# Patient Record
Sex: MALE | Race: WHITE
[De-identification: names, ages, dates, MRNs, and addresses within clinical notes are randomized per-mention and may not be internally consistent; named-entity substitution may affect disease eponyms.]

---

## 2017-06-18 ENCOUNTER — HOSPITAL ENCOUNTER (INPATIENT)
Dept: HOSPITAL 75 - ER | Age: 62
LOS: 3 days | Discharge: HOME | DRG: 690 | End: 2017-06-21
Attending: INTERNAL MEDICINE | Admitting: INTERNAL MEDICINE
Payer: COMMERCIAL

## 2017-06-18 VITALS — SYSTOLIC BLOOD PRESSURE: 122 MMHG | DIASTOLIC BLOOD PRESSURE: 69 MMHG

## 2017-06-18 VITALS — DIASTOLIC BLOOD PRESSURE: 60 MMHG | SYSTOLIC BLOOD PRESSURE: 130 MMHG

## 2017-06-18 VITALS — BODY MASS INDEX: 24.38 KG/M2 | WEIGHT: 180 LBS | HEIGHT: 72 IN

## 2017-06-18 VITALS — SYSTOLIC BLOOD PRESSURE: 98 MMHG | DIASTOLIC BLOOD PRESSURE: 60 MMHG

## 2017-06-18 DIAGNOSIS — B96.20: ICD-10-CM

## 2017-06-18 DIAGNOSIS — I10: ICD-10-CM

## 2017-06-18 DIAGNOSIS — M10.9: ICD-10-CM

## 2017-06-18 DIAGNOSIS — N10: Primary | ICD-10-CM

## 2017-06-18 LAB
ALBUMIN SERPL-MCNC: 4 GM/DL (ref 3.2–4.5)
ALT SERPL-CCNC: 20 U/L (ref 0–55)
ANION GAP SERPL CALC-SCNC: 11 MMOL/L (ref 5–14)
AST SERPL-CCNC: 21 U/L (ref 5–34)
BASOPHILS # BLD AUTO: 0 10^3/UL (ref 0–0.1)
BASOPHILS NFR BLD AUTO: 0 % (ref 0–10)
BILIRUB SERPL-MCNC: 1.2 MG/DL (ref 0.1–1)
BILIRUB UR QL STRIP: NEGATIVE
BUN SERPL-MCNC: 12 MG/DL (ref 7–18)
BUN/CREAT SERPL: 14 (ref 0–20)
CALCIUM SERPL-MCNC: 9.7 MG/DL (ref 8.5–10.1)
CHLORIDE SERPL-SCNC: 103 MMOL/L (ref 98–107)
CO2 SERPL-SCNC: 21 MMOL/L (ref 21–32)
CREAT SERPL-MCNC: 0.86 MG/DL (ref 0.6–1.3)
EOSINOPHIL # BLD AUTO: 0 10^3/UL (ref 0–0.3)
EOSINOPHIL NFR BLD AUTO: 0 % (ref 0–10)
ERYTHROCYTE [DISTWIDTH] IN BLOOD BY AUTOMATED COUNT: 12.9 % (ref 10–14.5)
GFR SERPLBLD BASED ON 1.73 SQ M-ARVRAT: > 60 ML/MIN
GLUCOSE SERPL-MCNC: 178 MG/DL (ref 70–105)
HEMOLYSIS: 7 (ref -100–29)
ICTERUS: 1.2 (ref -100–1.9)
KETONES UR QL STRIP: (no result)
LEUKOCYTE ESTERASE UR QL STRIP: (no result)
LIPEMIA: -1 (ref -100–49)
LYMPHOCYTES # BLD AUTO: 1.2 X 10^3 (ref 1–4)
LYMPHOCYTES NFR BLD AUTO: 6 % (ref 12–44)
MCH RBC QN AUTO: 31 PG (ref 25–34)
MCHC RBC AUTO-ENTMCNC: 33 G/DL (ref 32–36)
MCV RBC AUTO: 92 FL (ref 80–99)
MONOCYTES # BLD AUTO: 2 X 10^3 (ref 0–1)
MONOCYTES NFR BLD AUTO: 9 % (ref 0–12)
NEUTROPHILS # BLD AUTO: 18.6 X 10^3 (ref 1.8–7.8)
NEUTROPHILS NFR BLD AUTO: 85 % (ref 42–75)
NEUTS BAND NFR BLD MANUAL: 90 %
NITRITE UR QL STRIP: POSITIVE
PH UR STRIP: 7 [PH] (ref 5–9)
PLATELET # BLD: 180 10^3/UL (ref 130–400)
PMV BLD AUTO: 10.4 FL (ref 7.4–10.4)
POTASSIUM SERPL-SCNC: 4.3 MMOL/L (ref 3.6–5)
PROT SERPL-MCNC: 7.7 GM/DL (ref 6.4–8.2)
PROT UR QL STRIP: (no result)
RBC # BLD AUTO: 4.73 10^6/UL (ref 4.35–5.85)
SODIUM SERPL-SCNC: 135 MMOL/L (ref 135–145)
SP GR UR STRIP: 1.01 (ref 1.02–1.02)
SQUAMOUS #/AREA URNS HPF: (no result) /HPF
UROBILINOGEN UR-MCNC: NORMAL MG/DL
VARIANT LYMPHS NFR BLD MANUAL: 4 %
WBC # BLD AUTO: 21.9 10^3/UL (ref 4.3–11)
WBC #/AREA URNS HPF: >100 /HPF

## 2017-06-18 PROCEDURE — 85007 BL SMEAR W/DIFF WBC COUNT: CPT

## 2017-06-18 PROCEDURE — 87186 SC STD MICRODIL/AGAR DIL: CPT

## 2017-06-18 PROCEDURE — 36415 COLL VENOUS BLD VENIPUNCTURE: CPT

## 2017-06-18 PROCEDURE — 81000 URINALYSIS NONAUTO W/SCOPE: CPT

## 2017-06-18 PROCEDURE — 87040 BLOOD CULTURE FOR BACTERIA: CPT

## 2017-06-18 PROCEDURE — 87088 URINE BACTERIA CULTURE: CPT

## 2017-06-18 PROCEDURE — 85025 COMPLETE CBC W/AUTO DIFF WBC: CPT

## 2017-06-18 PROCEDURE — 85027 COMPLETE CBC AUTOMATED: CPT

## 2017-06-18 PROCEDURE — 83605 ASSAY OF LACTIC ACID: CPT

## 2017-06-18 PROCEDURE — 80053 COMPREHEN METABOLIC PANEL: CPT

## 2017-06-18 RX ADMIN — ACETAMINOPHEN PRN MG: 500 TABLET ORAL at 16:29

## 2017-06-18 RX ADMIN — ZOLPIDEM TARTRATE SCH MG: 5 TABLET ORAL at 22:00

## 2017-06-18 RX ADMIN — LACTULOSE SCH GM: 20 SOLUTION ORAL at 18:53

## 2017-06-18 RX ADMIN — SODIUM CHLORIDE SCH MLS/HR: 900 INJECTION, SOLUTION INTRAVENOUS at 09:37

## 2017-06-18 RX ADMIN — LACTULOSE SCH GM: 20 SOLUTION ORAL at 10:16

## 2017-06-18 RX ADMIN — SODIUM CHLORIDE SCH MLS/HR: 900 INJECTION, SOLUTION INTRAVENOUS at 18:52

## 2017-06-18 RX ADMIN — LACTULOSE SCH GM: 20 SOLUTION ORAL at 13:42

## 2017-06-18 NOTE — HISTORY & PHYSICAL-HOSPITALIST
HPI


History of Present Illness:


HPI/Chief Complaint


CC: Fever and chills and dysuria





HPI: This is a 62-year-old white male clinic patient of Dr. Joshua that sees 

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 

years that presents to the emergency room this morning at 7 o'clock with fever 

chills and and ambulate urinate.  He reports that the symptoms started Friday 

morning and had his daughter's wedding rehearsal at that point was taking 

ibuprofen and Tylenol for nonspecific fatigue and pain especially in the back 

that then progressed along the day of the wedding on Saturday becoming feverish 

having sweats and chilling then began having problems urinating feeling like 

the urine was going the wrong direction.  He was becoming very nauseated and 

the severity of his back pain was too much to bear so he presented to the 

emergency room found to have acute polynephritis with one-to-one fever and 

white count of 21.9.  Patient feels much better since IV fluids anti-emetics 

and pain medication have been initiated.


Source:  patient


Exam Limitations:  no limitations


Date Seen


6/18/17


Time Seen by Provider:  09:45


Attending Physician


Ariella Ludwig Floyd R MD


Referring Physician





Date of Admission


Jun 18, 2017 at 08:30





Home Medications & Allergies


Home Medications


Reviewed patient Home Medication Reconciliation Form





Allergies





Allergies


Coded Allergies


  No Known Drug Allergies (Unverified6/18/17)








Past Medical-Social-Family Hx


Patient Social History


Marrital Status:  


Employed/Student:  employed (35 years [employer])


Alcohol Use:  Occasionally Uses


Recreational Drug Use:  No


Smoking Status:  Never a Smoker


Physical Abuse Screen:  No


Sexual Abuse:  No


Recent Foreign Travel:  No


Contact w/other who traveled:  No


Recent Hopitalizations:  No


Recent Infectious Disease Expo:  No





Seasonal Allergies


Seasonal Allergies:  No





Surgeries


HX Surgeries:  Yes


Surgeries:  Orthopedic (meniscus)





Respiratory


Hx Respiratory Disorders:  No





Cardiovascular


Hx Cardiovascular Disorders:  Yes


Cardiac Disorders:  Hypertension





Neurological


Hx Neurological Disorders:  No





Reproductive System


Sexually Transmitted Disease:  No


HIV/AIDS:  No





Genitourinary


Hx Genitourinary Disorders:  No





Gastrointestinal


Hx Gastrointestinal Disorders:  No





Musculoskeletal


Hx Musculoskeletal Disorders:  No


Musculoskeletal Disorders:  Gout





Endocrine


Hx Endocrine Disorders:  No





HEENT


HX ENT Disorders:  No


Hearing Impairment:  Denies





Cancer


Hx Cancer:  No





Psychosocial


Hx Psychiatric Problems:  No





Integumentary


HX Skin/Integumentary Disorder:  No





Blood Transfusions


Adverse Reaction to a Blood Tr:  No





Reviewed Nursing Assessment


Reviewed/Agree w Nursing PMH:  Yes





Family Medical History


Family Hx:  


Arthritis


Cardiovascular disease


Cataracts


Diabetes mellitus


Hypertension


Kidney disease


Myocardial infarction





Review of Systems


Date Seen by Provider:  Jun 18, 2017


Time Seen by Provider:  09:45


Constitutional:  see HPI, chills, diaphoresis, dizziness, fever, malaise, 

weakness


EENTM:  no symptoms reported


Respiratory:  no symptoms reported


Cardiovascular:  no symptoms reported


Gastrointestinal:  loss of appetite, nausea, vomiting


Genitourinary:  decreased output, dysuria, frequency, hematuria, hesitancy, pain


Musculoskeletal:  back pain


Skin:  no symptoms reported


Psychiatric/Neurological:  Anxiety


All Other Systems Reviewed


Negative Unless Noted:  Yes





Physical Exam


Physical Exam


Vital Signs





Vital Sign - Last 12Hours








 6/18/17 6/18/17





 07:50 08:55


 


Temp 98.6 


 


Pulse 103 


 


Resp 18 


 


B/P (MAP) 141/89 


 


Pulse Ox  98


 


O2 Delivery  Room Air





Capillary Refill : Less Than 3 Seconds


General Appearance:  No Apparent Distress, WD/WN, Other (moderately acutely ill)


Eyes:  Bilateral Eye Normal Inspection, Bilateral Eye PERRL


HEENT:  PERRL/EOMI, Normal ENT Inspection, Pharynx Normal


Neck:  Full Range of Motion, Normal Inspection, Non Tender, Supple, Carotid 

Bruit


Respiratory:  Chest Non Tender, Lungs Clear, Normal Breath Sounds, No Accessory 

Muscle Use, No Respiratory Distress


Cardiovascular:  Regular Rate, Rhythm, No Edema, No Gallop, No JVD, No Murmur, 

Normal Peripheral Pulses


Gastrointestinal:  Normal Bowel Sounds, No Organomegaly, No Pulsatile Mass, Non 

Tender, Soft


Back:  Normal Inspection, No CVA Tenderness, No Vertebral Tenderness


Extremity:  Normal Capillary Refill, Normal Inspection, Normal Range of Motion, 

Non Tender, No Calf Tenderness, No Pedal Edema


Neurologic/Psychiatric:  Alert, Oriented x3, No Motor/Sensory Deficits, Normal 

Mood/Affect


Skin:  Normal Color, Damp


Lymphatic:  No Adenopathy





Results


Results/Procedures


Lab


Laboratory Tests


6/18/17 07:55














Assessment/Plan


Admission Diagnosis


Assessment:


Acute pyelonephritis with fever and chills and leukocytosis of 21,000


History of hypertension





Assessment and Plan


Plan:


Empiric antibiotics of Rocephin


Pancultured


Await urine culture results


IV fluids


Anti-medics


Antipyretics


SCDs





Clinical Quality Measures


DVT/VTE Risk/Contraindication:


Risk Factor Score Per Nursing:  3


RFS Level Per Nursing on Admit:  3=High











ARIELLA LUDWIG DO Jun 18, 2017 11:41

## 2017-06-18 NOTE — ED ABDOMINAL PAIN
General


Chief Complaint:  Fever-Adult/Adol


Stated Complaint:  BACK ACHE,SWEAT,FEVER


Source of Information:  Patient, Family


Exam Limitations:  No Limitations





History of Present Illness


Time Seen By Provider:  08:10


Initial Comments


This 62-year-old male presents with a 2 day history of fever and backache.  The 

patient has had associated dysuria.  The patient has had no associated headache 

or stiff neck, productive cough, chest pain or palpitations, blood in the stool 

or vomiting.





The patient is under the care of Dr. Joshua.  





The patient's significant past medical history includes hypertension and gout.





Allergies and Home Medications


Allergies


Coded Allergies:  


     No Known Drug Allergies (Unverified , 6/18/17)





Home Medications


Allopurinol 300 Mg Tablet, #90 (Reported)


Amlodipine Besylate 5 Mg Tablet, #90 (Reported)


Enalapril Maleate 20 Mg Tablet, #90 (Reported)


Hydrocodone/Acetaminophen 1 Each Tablet, #60 (Reported)


Meloxicam 15 Mg Tablet, #30 (Reported)





Review of Systems


Constitutional:  No chills, fever, malaise


EENTM:  No Blurred Vision


Respiratory:  Denies Cough


Cardiovascular:  Denies Chest Pain


Gastrointestinal:  Denies Abdominal Pain


Genitourinary:  Burning, Discharge, Frequency, Denies Flank Pain


Musculoskeletal:  back pain


Skin:  No rash


Psychiatric/Neurological:  No Symptoms Reported


Endocrine:  No Symptoms Reported


Hematologic/Lymphatic:  No Symptoms Reported





Past Medical-Social-Family Hx


Patient Social History


Recent Foreign Travel:  No


Contact w/Someone Who Travel:  No





Reviewed Nursing Assessment


Reviewed/Agree w Nursing PMH:  Yes





Physical Exam


Vital Signs





 VS - Last 72 Hours, by Label








 6/18/17





 07:50


 


Temp 98.6


 


Pulse 103


 


Resp 18


 


B/P (MAP) 141/89





Capillary Refill :


General Appearance:  WD/WN, mild distress


HEENT:  PERRL/EOMI, normal ENT inspection


Neck:  non-tender, supple, normal inspection


Respiratory:  lungs clear, normal breath sounds, no respiratory distress


Cardiovascular:  normal peripheral pulses, regular rate, rhythm


Gastrointestinal:  normal bowel sounds


Extremities:  normal range of motion, non-tender, normal inspection


Back:  normal inspection, other (mild bilateral flank tenderness)


Neurologic/Psychiatric:  no motor/sensory deficits, alert, normal mood/affect, 

oriented x 3


Skin:  normal color, warm/dry





Focused Exam


Lactic Acid Level





Laboratory Tests








Test


  6/18/17


07:55


 


Lactic Acid Level


  1.04 MMOL/L


(0.50-2.00)











Progress/Results/Core Measures


Results/Orders


Lab Results





Laboratory Tests








Test


  6/18/17


07:55 6/18/17


08:10 Range/Units


 


 


White Blood Count


  21.9 H


  


  4.3-11.0


10^3/uL


 


Red Blood Count


  4.73 


  


  4.35-5.85


10^6/uL


 


Hemoglobin 14.6   13.3-17.7  G/DL


 


Hematocrit 44   40-54  %


 


Mean Corpuscular Volume 92   80-99  FL


 


Mean Corpuscular Hemoglobin 31   25-34  PG


 


Mean Corpuscular Hemoglobin


Concent 33 


  


  32-36  G/DL


 


 


Red Cell Distribution Width 12.9   10.0-14.5  %


 


Platelet Count


  180 


  


  130-400


10^3/uL


 


Mean Platelet Volume 10.4   7.4-10.4  FL


 


Neutrophils (%) (Auto) 85 H  42-75  %


 


Lymphocytes (%) (Auto) 6 L  12-44  %


 


Monocytes (%) (Auto) 9   0-12  %


 


Eosinophils (%) (Auto) 0   0-10  %


 


Basophils (%) (Auto) 0   0-10  %


 


Neutrophils # (Auto) 18.6 H  1.8-7.8  X 10^3


 


Lymphocytes # (Auto) 1.2   1.0-4.0  X 10^3


 


Monocytes # (Auto) 2.0 H  0.0-1.0  X 10^3


 


Eosinophils # (Auto)


  0.0 


  


  0.0-0.3


10^3/uL


 


Basophils # (Auto)


  0.0 


  


  0.0-0.1


10^3/uL


 


Sodium Level 135   135-145  MMOL/L


 


Potassium Level 4.3   3.6-5.0  MMOL/L


 


Chloride Level 103     MMOL/L


 


Carbon Dioxide Level 21   21-32  MMOL/L


 


Anion Gap 11   5-14  MMOL/L


 


Blood Urea Nitrogen 12   7-18  MG/DL


 


Creatinine


  0.86 


  


  0.60-1.30


MG/DL


 


Estimat Glomerular Filtration


Rate > 60 


  


   


 


 


BUN/Creatinine Ratio 14   0-20  


 


Lactic Acid Level


  1.04 


  


  0.50-2.00


MMOL/L


 


Calcium Level 9.7   8.5-10.1  MG/DL


 


Aspartate Amino Transf


(AST/SGOT) 21 


  


  5-34  U/L


 


 


Alanine Aminotransferase


(ALT/SGPT) 20 


  


  0-55  U/L


 


 


Alkaline Phosphatase 70     U/L


 


Total Protein 7.7   6.4-8.2  GM/DL


 


Albumin 4.0   3.2-4.5  GM/DL


 


Urine Color  YELLOW   


 


Urine Clarity


  


  SLIGHTLY


CLOUDY  


 


 


Urine pH  7  5-9  


 


Urine Specific Gravity  1.010 L 1.016-1.022  


 


Urine Protein  2+ H NEGATIVE  


 


Urine Glucose (UA)  NEGATIVE  NEGATIVE  


 


Urine Ketones  3+ H NEGATIVE  


 


Urine Nitrite  POSITIVE H NEGATIVE  


 


Urine Bilirubin  NEGATIVE  NEGATIVE  


 


Urine Urobilinogen  NORMAL  NORMAL  MG/DL


 


Urine Leukocyte Esterase  3+ H NEGATIVE  


 


Urine RBC (Auto)  3+ H NEGATIVE  


 


Urine RBC  2-5 H  /HPF


 


Urine WBC  >100 H  /HPF


 


Urine Squamous Epithelial


Cells 


  RARE 


   /HPF


 


 


Urine Crystals  NONE   /LPF


 


Urine Bacteria  LARGE H  /HPF


 


Urine Casts  NONE   /LPF


 


Urine Mucus  NEGATIVE   /LPF


 


Urine Culture Indicated  YES   








My Orders





Orders - POP GURROLA MD


Cbc With Automated Diff (6/18/17 07:50)


Comprehensive Metabolic Panel (6/18/17 07:50)


Ua Culture If Indicated (6/18/17 07:50)


Lactic Acid Analyzer (6/18/17 07:50)


Blood Culture (6/18/17 07:50)


Ns Iv 1000 Ml (Sodium Chloride 0.9%) (6/18/17 08:00)


Ibuprofen  Tablet (Motrin  Tablet) (6/18/17 08:00)


Fentanyl  Injection (Sublimaze Injection (6/18/17 08:00)


Manual Differential (6/18/17 07:55)


Blood Culture (6/18/17 08:22)


Urine Culture (6/18/17 08:10)





Medications Given in ED





Current Medications








 Medications  Dose


 Ordered  Sig/Shannan


 Route  Start Time


 Stop Time Status Last Admin


Dose Admin


 


 Fentanyl Citrate  50 mcg  ONCE  ONCE


 IVP  6/18/17 08:00


 6/18/17 08:01 DC 6/18/17 08:08


50 MCG


 


 Ibuprofen  800 mg  ONCE  ONCE


 PO  6/18/17 08:00


 6/18/17 08:01 DC 6/18/17 08:08


800 MG








Vital Signs/I&O





Vital Sign - Last 12Hours








 6/18/17





 07:50


 


Temp 98.6


 


Pulse 103


 


Resp 18


 


B/P (MAP) 141/89








Progress Note :  


   Time:  08:30


Progress Note


The patient's white count was 22,000.  The patient's urine was consistent with 

a significant urinary tract infection.





The patient's lactic acid was 1.04.  2 blood cultures were done.





The patient received 2 g Rocephin IV.





Telephone consultation was undertaken with Dr. Ludwig who is kind enough to 

admit the patient for further evaluation and care.





Departure


Communication


Time/Spoke to Admitting Phy:  08:31


Communication


Dr. Ludwig.





Impression


Impression:  


 Primary Impression:  


 Pyelonephritis


Disposition:  09 ADMITTED AS INPATIENT


Condition:  Improved


Decision to Admit Reason:  Admit from ER (General)


Decision to Admit/Date:  Jun 18, 2017


Time/Decision to Admit Time:  08:31





Departure-Patient Inst.


Referrals:  


JAQUELIN JOSHUA MD (PCP)


Primary Care Physician











POP GURROLA MD Jun 18, 2017 07:49

## 2017-06-19 VITALS — DIASTOLIC BLOOD PRESSURE: 76 MMHG | SYSTOLIC BLOOD PRESSURE: 148 MMHG

## 2017-06-19 VITALS — DIASTOLIC BLOOD PRESSURE: 72 MMHG | SYSTOLIC BLOOD PRESSURE: 126 MMHG

## 2017-06-19 VITALS — SYSTOLIC BLOOD PRESSURE: 134 MMHG | DIASTOLIC BLOOD PRESSURE: 77 MMHG

## 2017-06-19 VITALS — DIASTOLIC BLOOD PRESSURE: 63 MMHG | SYSTOLIC BLOOD PRESSURE: 100 MMHG

## 2017-06-19 VITALS — DIASTOLIC BLOOD PRESSURE: 65 MMHG | SYSTOLIC BLOOD PRESSURE: 112 MMHG

## 2017-06-19 VITALS — SYSTOLIC BLOOD PRESSURE: 109 MMHG | DIASTOLIC BLOOD PRESSURE: 70 MMHG

## 2017-06-19 VITALS — DIASTOLIC BLOOD PRESSURE: 63 MMHG | SYSTOLIC BLOOD PRESSURE: 116 MMHG

## 2017-06-19 LAB
ALBUMIN SERPL-MCNC: 3.5 GM/DL (ref 3.2–4.5)
ALT SERPL-CCNC: 20 U/L (ref 0–55)
ANION GAP SERPL CALC-SCNC: 9 MMOL/L (ref 5–14)
AST SERPL-CCNC: 19 U/L (ref 5–34)
BASOPHILS # BLD AUTO: 0 10^3/UL (ref 0–0.1)
BASOPHILS NFR BLD AUTO: 0 % (ref 0–10)
BILIRUB SERPL-MCNC: 0.6 MG/DL (ref 0.1–1)
BILIRUB UR QL STRIP: NEGATIVE
BUN SERPL-MCNC: 13 MG/DL (ref 7–18)
BUN/CREAT SERPL: 15 (ref 0–20)
CALCIUM SERPL-MCNC: 9.1 MG/DL (ref 8.5–10.1)
CHLORIDE SERPL-SCNC: 108 MMOL/L (ref 98–107)
CO2 SERPL-SCNC: 23 MMOL/L (ref 21–32)
CREAT SERPL-MCNC: 0.88 MG/DL (ref 0.6–1.3)
EOSINOPHIL # BLD AUTO: 0 10^3/UL (ref 0–0.3)
EOSINOPHIL NFR BLD AUTO: 0 % (ref 0–10)
ERYTHROCYTE [DISTWIDTH] IN BLOOD BY AUTOMATED COUNT: 13 % (ref 10–14.5)
GFR SERPLBLD BASED ON 1.73 SQ M-ARVRAT: > 60 ML/MIN
GLUCOSE SERPL-MCNC: 135 MG/DL (ref 70–105)
HEMOLYSIS: 7 (ref -100–29)
ICTERUS: 0.6 (ref -100–1.9)
KETONES UR QL STRIP: NEGATIVE
LEUKOCYTE ESTERASE UR QL STRIP: NEGATIVE
LIPEMIA: -1 (ref -100–49)
LYMPHOCYTES # BLD AUTO: 1 X 10^3 (ref 1–4)
LYMPHOCYTES NFR BLD AUTO: 8 % (ref 12–44)
MCH RBC QN AUTO: 31 PG (ref 25–34)
MCHC RBC AUTO-ENTMCNC: 33 G/DL (ref 32–36)
MCV RBC AUTO: 95 FL (ref 80–99)
MONOCYTES # BLD AUTO: 1.4 X 10^3 (ref 0–1)
MONOCYTES NFR BLD AUTO: 11 % (ref 0–12)
NEUTROPHILS # BLD AUTO: 10.3 X 10^3 (ref 1.8–7.8)
NEUTROPHILS NFR BLD AUTO: 81 % (ref 42–75)
NITRITE UR QL STRIP: NEGATIVE
PH UR STRIP: 6.5 [PH] (ref 5–9)
PLATELET # BLD: 154 10^3/UL (ref 130–400)
PMV BLD AUTO: 10.9 FL (ref 7.4–10.4)
POTASSIUM SERPL-SCNC: 4.3 MMOL/L (ref 3.6–5)
PROT SERPL-MCNC: 6.6 GM/DL (ref 6.4–8.2)
PROT UR QL STRIP: (no result)
RBC # BLD AUTO: 4.39 10^6/UL (ref 4.35–5.85)
SODIUM SERPL-SCNC: 140 MMOL/L (ref 135–145)
SP GR UR STRIP: 1.01 (ref 1.02–1.02)
UROBILINOGEN UR-MCNC: 1 MG/DL
WBC # BLD AUTO: 12.7 10^3/UL (ref 4.3–11)
WBC #/AREA URNS HPF: (no result) /HPF

## 2017-06-19 RX ADMIN — ZOLPIDEM TARTRATE SCH MG: 5 TABLET ORAL at 20:39

## 2017-06-19 RX ADMIN — LACTULOSE SCH GM: 20 SOLUTION ORAL at 12:53

## 2017-06-19 RX ADMIN — SODIUM CHLORIDE SCH MLS/HR: 900 INJECTION, SOLUTION INTRAVENOUS at 05:00

## 2017-06-19 RX ADMIN — LACTULOSE SCH GM: 20 SOLUTION ORAL at 19:51

## 2017-06-19 RX ADMIN — AMLODIPINE BESYLATE SCH MG: 5 TABLET ORAL at 20:40

## 2017-06-19 RX ADMIN — LACTULOSE SCH GM: 20 SOLUTION ORAL at 10:07

## 2017-06-19 RX ADMIN — ALLOPURINOL SCH MG: 300 TABLET ORAL at 20:40

## 2017-06-19 RX ADMIN — IBUPROFEN PRN MG: 800 TABLET, FILM COATED ORAL at 03:14

## 2017-06-19 RX ADMIN — SODIUM CHLORIDE SCH MLS/HR: 900 INJECTION, SOLUTION INTRAVENOUS at 16:00

## 2017-06-19 RX ADMIN — IBUPROFEN PRN MG: 800 TABLET, FILM COATED ORAL at 19:47

## 2017-06-19 RX ADMIN — SODIUM CHLORIDE SCH MLS/HR: 900 INJECTION INTRAVENOUS at 10:07

## 2017-06-19 NOTE — PROGRESS NOTE-HOSPITALIST
Standard Progress Note


Progress Notes/Assess & Plan


Date Seen


6/19/17


Time Seen by Provider:  10:51


Diagnosis


Assessment:


Acute pyelonephritis with fever and chills and leukocytosis of 21,000


History of hypertension


Assess & Plan/Chief Complaint


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 

youth and basketball at the .  He was admitted over the weekend after 

presenting with a course of fever and chills and a white count of 21,000.  This 

was consistent with pyelonephritis.  He was able to tough it out that through 

his daughter's wedding on Saturday before coming to the emergency room.  The 

early report this morning I confirmed that he was growing Escherichia coli from 

both the blood in the urine.  He reports he is beginning to feel better.  His 

peak temperature over the past 24 hours was 101.2.





Physical exam: He is alert and oriented he appears flushed.  Lungs are clear to 

auscultation.  CV is regular without murmur.  Abdomen is soft without 

tenderness to palpation.  Extremities show no pedal edema.





Impression: Sepsis.  2.pyelonephritis with Escherichia coli.


Labs


Laboratory Tests


6/18/17 07:55








6/19/17 06:20




















JACINTO MONSIVAIS MD Jun 19, 2017 10:54

## 2017-06-20 VITALS — DIASTOLIC BLOOD PRESSURE: 70 MMHG | SYSTOLIC BLOOD PRESSURE: 134 MMHG

## 2017-06-20 VITALS — DIASTOLIC BLOOD PRESSURE: 70 MMHG | SYSTOLIC BLOOD PRESSURE: 109 MMHG

## 2017-06-20 VITALS — SYSTOLIC BLOOD PRESSURE: 141 MMHG | DIASTOLIC BLOOD PRESSURE: 82 MMHG

## 2017-06-20 VITALS — DIASTOLIC BLOOD PRESSURE: 75 MMHG | SYSTOLIC BLOOD PRESSURE: 135 MMHG

## 2017-06-20 VITALS — DIASTOLIC BLOOD PRESSURE: 69 MMHG | SYSTOLIC BLOOD PRESSURE: 122 MMHG

## 2017-06-20 VITALS — DIASTOLIC BLOOD PRESSURE: 74 MMHG | SYSTOLIC BLOOD PRESSURE: 117 MMHG

## 2017-06-20 VITALS — SYSTOLIC BLOOD PRESSURE: 123 MMHG | DIASTOLIC BLOOD PRESSURE: 69 MMHG

## 2017-06-20 RX ADMIN — SODIUM CHLORIDE SCH MLS/HR: 900 INJECTION, SOLUTION INTRAVENOUS at 03:38

## 2017-06-20 RX ADMIN — LACTULOSE SCH GM: 20 SOLUTION ORAL at 08:17

## 2017-06-20 RX ADMIN — SODIUM CHLORIDE SCH MLS/HR: 900 INJECTION INTRAVENOUS at 08:13

## 2017-06-20 RX ADMIN — SODIUM CHLORIDE SCH MLS/HR: 900 INJECTION, SOLUTION INTRAVENOUS at 14:15

## 2017-06-20 RX ADMIN — SODIUM CHLORIDE SCH MLS/HR: 900 INJECTION, SOLUTION INTRAVENOUS at 01:29

## 2017-06-20 RX ADMIN — ACETAMINOPHEN PRN MG: 500 TABLET ORAL at 23:40

## 2017-06-20 RX ADMIN — LACTULOSE SCH GM: 20 SOLUTION ORAL at 19:36

## 2017-06-20 RX ADMIN — LACTULOSE SCH GM: 20 SOLUTION ORAL at 12:04

## 2017-06-20 RX ADMIN — AMLODIPINE BESYLATE SCH MG: 5 TABLET ORAL at 20:36

## 2017-06-20 RX ADMIN — ACETAMINOPHEN PRN MG: 500 TABLET ORAL at 07:22

## 2017-06-20 RX ADMIN — ALLOPURINOL SCH MG: 300 TABLET ORAL at 20:36

## 2017-06-20 RX ADMIN — IBUPROFEN PRN MG: 800 TABLET, FILM COATED ORAL at 14:59

## 2017-06-20 RX ADMIN — ZOLPIDEM TARTRATE SCH MG: 5 TABLET ORAL at 20:36

## 2017-06-20 NOTE — PROGRESS NOTE-HOSPITALIST
Standard Progress Note


Progress Notes/Assess & Plan


Date Seen


6/20/17


Time Seen by Provider:  10:25


Diagnosis


Assessment:


Acute pyelonephritis with fever and chills and leukocytosis of 21,000


History of hypertension


Assess & Plan/Chief Complaint


And so are we ready to go 20 I can I I have other things to do so.  Suture were 

is a The patient exhibited fever through the night both around midnight and 

again at the [id number] time frame.  The MAXIMUM TEMPERATURE was 102.4.  He 

reported both sweats and aches during this period of time.  This has broken 

that he became anxious about what was going on.  He was assured that this was 

not unfortunate but not a complication.  His Escherichia coli is widely 

sensitive.  The fever may have been toxins.





Physical exam: His face is somewhat flushed.  Skin is dry.  Lungs are clear to 

auscultation.  CV shows a rate of 105.





Impression: Escherichia coli urinary tract infection with septicemia.





Plan: Continue IV antibiotics.  The patient was assured that he would be kept 

as an inpatient until he had nearly 24 hours afebrile.


Labs


Laboratory Tests


6/19/17 06:20




















JACINTO MONSIVAIS MD Jun 20, 2017 10:53

## 2017-06-21 VITALS — DIASTOLIC BLOOD PRESSURE: 76 MMHG | SYSTOLIC BLOOD PRESSURE: 117 MMHG

## 2017-06-21 VITALS — DIASTOLIC BLOOD PRESSURE: 89 MMHG | SYSTOLIC BLOOD PRESSURE: 149 MMHG

## 2017-06-21 RX ADMIN — SODIUM CHLORIDE SCH MLS/HR: 900 INJECTION INTRAVENOUS at 08:15

## 2017-06-21 RX ADMIN — SODIUM CHLORIDE SCH MLS/HR: 900 INJECTION, SOLUTION INTRAVENOUS at 01:25

## 2017-06-21 RX ADMIN — LACTULOSE SCH GM: 20 SOLUTION ORAL at 08:15

## 2017-06-21 NOTE — DISCHARGE SUMMARY-HOSPITALIST
Diagnosis/Chief Complaint


Date of Admission


Jun 18, 2017 at 08:30


Date of Discharge


Jun 21, 2017 at 11:55


Discharge Date:  Jun 21, 2017


Admission Diagnosis


Assessment:


Acute pyelonephritis with fever and chills and leukocytosis of 21,000


History of hypertension





Discharge Diagnosis


Assessment:


Acute pyelonephritis with fever and chills and leukocytosis of 21,000


History of hypertension








Plan:


Empiric antibiotics of Rocephin


Pancultured


Await urine culture results


IV fluids


Anti-medics


Antipyretics


SCDs








Reason Hospital Visit/Course


CC: Fever and chills and dysuria





HPI: This is a 62-year-old white male clinic patient of Dr. Joshua that sees 

him once a year for health maintenance for work at [employer] for the past 35 

years that presents to the emergency room this morning at 7 o'clock with fever 

chills and and ambulate urinate.  He reports that the symptoms started Friday 

morning and had his daughter's wedding rehearsal at that point was taking 

ibuprofen and Tylenol for nonspecific fatigue and pain especially in the back 

that then progressed along the day of the wedding on Saturday becoming feverish 

having sweats and chilling then began having problems urinating feeling like 

the urine was going the wrong direction.  He was becoming very nauseated and 

the severity of his back pain was too much to bear so he presented to the 

emergency room found to have acute polynephritis with one-to-one fever and 

white count of 21.9.  Patient feels much better since IV fluids anti-emetics 

and pain medication have been initiated.





Note from 6/21/17:


Patient doing well overall still has periodic low-grade fevers and I counseled 

him that that would be a possibility but to maintain Tylenol and ibuprofen 

regimen as instructed


Will place on Omnicef for a total of 6 more days due to bacteremia of 

Escherichia coli


He will have close follow-up with Dr. Joshua next week





No fever, vital signs stable, pleasant, improved


Regular rate and rhythm, clear to auscultation bilaterally


No edema





Hospital course: Patient had a lengthy hospital course he was empirically 

placed on Rocephin E coli resulted on urine culture and blood culture and fever 

persisted that was monitored given antipyretics and maintained IV fluids and 

ambulation and pain control and improved enough on day of discharge she was 

feeling well enough to tolerate oral antibiotics will have close follow-up his 

primary care provider.





Discharge Summary


Discharge Physical Examination


Allergies:  


Coded Allergies:  


     No Known Drug Allergies (Unverified , 6/18/17)


Vitals & I&Os





Vital Signs








  Date Time  Temp Pulse Resp B/P (MAP) Pulse Ox O2 Delivery O2 Flow Rate FiO2


 


6/21/17 11:55        


 


6/21/17 08:00 98.7 72 20  98 Room Air  











Hospital Course


Labs (last 24 hrs)





Microbiology


6/18/17 Blood Culture - Preliminary, Resulted


          No growth


6/18/17 Urine Culture - Final, Complete


          Escherichia Coli








Discharge


Home Medications:





Active Scripts


Active


Cefdinir 300 Mg Capsule 300 Mg PO BID


Reported


Tylenol Extra Strength (Acetaminophen) 500 Mg Tablet 500-1,000 Mg PO Q6H PRN


     ALTERNATES WITH IBU


Advil (Ibuprofen) 200 Mg Tablet 800 Mg PO TID PRN


     ALTERNATES WITH TYLENOL


Allopurinol 300 Mg Tablet 300 Mg PO HS


Hydrocodon-Acetaminophn  (Hydrocodone/Acetaminophen) 1 Each Tablet 0.5-1 

Tab PO Q6H PRN


Amlodipine Besylate 5 Mg Tablet 5 Mg PO HS


Enalapril Maleate 20 Mg Tablet 20 PO HS





Instructions to patient/family


Please see electonic discharge instructions given to patient.





Clinical Quality Measures


DVT/VTE Risk/Contraindication:


Risk Factor Score Per Nursing:  3


RFS Level Per Nursing on Admit:  3=High











KATARIAN DOE DO Jun 21, 2017 13:18

## 2017-07-12 ENCOUNTER — HOSPITAL ENCOUNTER (OUTPATIENT)
Dept: HOSPITAL 75 - PREOP | Age: 62
End: 2017-07-12
Attending: SURGERY
Payer: COMMERCIAL

## 2017-07-12 VITALS — BODY MASS INDEX: 28.17 KG/M2 | HEIGHT: 72 IN | WEIGHT: 208 LBS

## 2017-07-12 DIAGNOSIS — Z01.818: Primary | ICD-10-CM

## 2017-07-12 DIAGNOSIS — Z12.11: ICD-10-CM

## 2017-07-14 ENCOUNTER — HOSPITAL ENCOUNTER (OUTPATIENT)
Dept: HOSPITAL 75 - ENDO | Age: 62
Discharge: HOME | End: 2017-07-14
Attending: SURGERY
Payer: COMMERCIAL

## 2017-07-14 VITALS — SYSTOLIC BLOOD PRESSURE: 115 MMHG | DIASTOLIC BLOOD PRESSURE: 76 MMHG

## 2017-07-14 VITALS — BODY MASS INDEX: 28.17 KG/M2 | WEIGHT: 208 LBS | HEIGHT: 72 IN

## 2017-07-14 VITALS — SYSTOLIC BLOOD PRESSURE: 120 MMHG | DIASTOLIC BLOOD PRESSURE: 80 MMHG

## 2017-07-14 VITALS — SYSTOLIC BLOOD PRESSURE: 151 MMHG | DIASTOLIC BLOOD PRESSURE: 78 MMHG

## 2017-07-14 DIAGNOSIS — I10: ICD-10-CM

## 2017-07-14 DIAGNOSIS — Z12.11: Primary | ICD-10-CM

## 2017-07-14 DIAGNOSIS — K57.30: ICD-10-CM

## 2017-07-14 RX ADMIN — FENTANYL CITRATE PRN MCG: 50 INJECTION, SOLUTION INTRAMUSCULAR; INTRAVENOUS at 14:23

## 2017-07-14 RX ADMIN — FENTANYL CITRATE PRN MCG: 50 INJECTION, SOLUTION INTRAMUSCULAR; INTRAVENOUS at 14:20

## 2017-07-14 RX ADMIN — MIDAZOLAM HYDROCHLORIDE PRN MG: 1 INJECTION, SOLUTION INTRAMUSCULAR; INTRAVENOUS at 14:21

## 2017-07-14 RX ADMIN — MIDAZOLAM HYDROCHLORIDE PRN MG: 1 INJECTION, SOLUTION INTRAMUSCULAR; INTRAVENOUS at 14:27

## 2017-07-14 RX ADMIN — MIDAZOLAM HYDROCHLORIDE PRN MG: 1 INJECTION, SOLUTION INTRAMUSCULAR; INTRAVENOUS at 14:24

## 2017-07-14 NOTE — DISCHARGE INST-SIMPLE/STANDARD
Discharge Inst-Standard


Discharge Medications


New, Converted or Re-Newed RX:  Other





Patient Instructions/Follow Up


Plan of Care/Instructions/FU:  


repeat colonoscopy 10 years


Activity as Tolerated:  Yes


Discharge Diet:  No Restrictions











NOELLE DASILVA MD Jul 14, 2017 2:46 pm

## 2017-07-14 NOTE — CONSCIOUS SEDATION/ASA
Conscious Sedation Pre-Proced


Time Reviewed:  13:57


ASA Class:  2











Airway Mallampati Classification: ([place] appropriate class) I.  II.  III,  IV


 


Lungs 


 


Heart 


 


 ASA score


 


 ASA 1: a normal healthy patient


 


 ASA 2:  a patient with a mild systemic disease (mid diabetes, controlled 

hypertension, obesity 


 


 ASA 3:  a patient with a severe systemic disease that limits activity  (angina

, COPD, prior Myocardial infarction)


 


 ASA 4:  a patient with an incapacitating disease that is a constant threat to 

life (CHF, renal failure)


 


 ASA 5:  a moribund patient not expected to survive 24 hrs.  (ruptured aneurysm)


 


 ASA 6:  a declared brain dead patient whose organs are being harvested.


 


 For emergent operations, add the letter E after the classification








Grade 1


Sedation Plan:  Discussed options with patient/fam


Note


The patient is an appropriate candidate to undergo the planned procedure, 

sedation, and anesthesia.





The patient immediately re-assessed prior to indication.











NOELLE DASILVA MD Jul 14, 2017 1:57 pm

## 2017-07-14 NOTE — HISTORY & PHYSICIAL
History of Present Illness


History of Present Illness


Reason for visit/HPI


to undergo screening colonoscopy.  No family history of colon cancer or polyps


Date of Admission





Date Seen by Provider:  Jul 14, 2017


Time Seen by Provider:  13:55


I consulted on this patient on


7/14/17


 13:55


Attending Physician


Noelle Dasilva MD


Admitting Physician


James Joshua MD


Consult








Allergies and Home Medications


Allergies


Coded Allergies:  


     No Known Drug Allergies (Unverified , 6/18/17)





Home Medications


Acetaminophen 500 Mg Tablet, 500-1,000 MG PO Q6H PRN for PAIN-MILD, (Reported)


   ALTERNATES WITH IBU 


Allopurinol 300 Mg Tablet, 300 MG PO HS, (Reported)


Amlodipine Besylate 5 Mg Tablet, 5 MG PO HS, (Reported)


Enalapril Maleate 20 Mg Tablet, 20 MG PO HS, (Reported)


Hydrocodone/Acetaminophen 1 Each Tablet, 0.5-1 TAB PO Q6H PRN for PAIN-MODERATE,

 (Reported)


Ibuprofen 200 Mg Tablet, 800 MG PO TID PRN for PAIN-MILD, (Reported)


   ALTERNATES WITH TYLENOL 





Past Medical-Social-Family Hx


Patient Social History


Marrital Status:  


Employed/Student:  employed


Alcohol Use:  Regular Use


Recreational Drug Use:  No


Smoking Status:  Never a Smoker


Recent Foreign Travel:  No


Contact w/other who traveled:  No


Recent Hopitalizations:  No


Recent Infectious Disease Expo:  No





Immunizations Up To Date


Tetanus Booster (TDap):  Unknown





Seasonal Allergies


Seasonal Allergies:  No





Surgeries


HX Surgeries:  Yes (knee scope, )


Surgeries:  Orthopedic





Respiratory


Hx Respiratory Disorders:  No





Cardiovascular


Hx Cardiovascular Disorders:  Yes


Cardiac Disorders:  Hypertension





Neurological


Hx Neurological Disorders:  No





Reproductive System


Hx Reproductive Disorders:  No


Sexually Transmitted Disease:  No


HIV/AIDS:  No





Genitourinary


Hx Genitourinary Disorders:  No





Gastrointestinal


Hx Gastrointestinal Disorders:  No





Musculoskeletal


Hx Musculoskeletal Disorders:  Yes


Musculoskeletal Disorders:  Arthritis, Gout





Endocrine


Hx Endocrine Disorders:  No





HEENT


HX ENT Disorders:  No


Hearing Impairment:  Denies





Cancer


Hx Cancer:  No





Psychosocial


Hx Psychiatric Problems:  No





Integumentary


HX Skin/Integumentary Disorder:  No





Blood Transfusions


Hx Blood Disorders:  No


Adverse Reaction to a Blood Tr:  No





Family Medical History


Family Hx:  


Arthritis


Cardiovascular disease


Cataracts


Diabetes mellitus


Hypertension


Kidney disease


Myocardial infarction





Constitutional:  no symptoms reported


Respiratory:  no symptoms reported


Cardiovascular:  no symptoms reported


Gastrointestinal:  no symptoms reported


Genitourinary:  no symptoms reported


Musculoskeletal:  gout, joint pain


Skin:  no symptoms reported


Psychiatric/Neurological:  No Symptoms Reported





Physical Exam


Vital Signs





Vital Sign - Last 12Hours








 7/14/17





 12:51


 


Temp 99.6


 


Pulse 60


 


Resp 16


 


B/P (MAP) 151/78


 


Pulse Ox 97


 


O2 Delivery Room Air





Capillary Refill :


General Appearance:  No Apparent Distress


HEENT:  Normal ENT Inspection


Neck:  Normal Inspection


Respiratory:  Lungs Clear


Cardiovascular:  Regular Rate, Rhythm


Gastrointestinal:  Non Tender, Soft


Rectal:  Deferred


Extremity:  Normal Inspection


Neurologic/Psychiatric:  Alert, Oriented x3


Skin:  Warm/Dry





Assessment/Plan


Assessment and Plan


Gentleman here to undergo screening colonoscopy.  No family history.  Details 

of the procedure, polyps diverticulosis etc. discussed.  Post polypectomy 

bleeding and iatrogenic perforation discussed in detail.  He seems to be in 

agreement to proceed


Problems:  











NOELLE DASILVA MD Jul 14, 2017 1:57 pm

## 2017-07-14 NOTE — ENDO PROCEDURE RECORD
Endo Procedure Report


Date of Procedure


Jul 14, 2017


Surgeon (s)


NOELLE DASILVA MD





Post Procedure/Op Diagnosis





sigmoid diverticulosis





Procedure Performed





colonoscopy to cecum





Description of Procedure


Anesthesia Type:  Conscious Sedation


Specimen(s) collected/removed


none


Description of the Procedure


Indication for procedure:this gentleman came in for screening colonoscopy.  He 

denied any family history of colon cancer.





Informed consent was obtained after reviewing the procedure in detail.





Description of procedure; He was placed in left lateral decubitus position and 

his vital signs were monitored.  Conscious sedation was achieved using Versed 

and fentanyl.  Digital rectal examination was unremarkable.  The colonoscope 

was then introduced into the rectum and advanced all the way up to the cecum.





Quality of bowel preparation was acceptable.





The scope was then withdrawn slowly and the mucosa examined in a systematic 

fashion.





FINDINGS: Very few sigmoid diverticula





He tolerated the procedure well and was taken back to the nursing area in a 

stable condition.





Impression: Screening colonoscopy.  No polyps.  No family history.  Recommend 

repeating in 5 years.


Copies To:   JAQUELIN CASTELLANOS MD, XAVIER M MD Jul 14, 2017 2:44 pm

## 2017-07-21 ENCOUNTER — HOSPITAL ENCOUNTER (OUTPATIENT)
Dept: HOSPITAL 75 - RAD | Age: 62
End: 2017-07-21
Attending: UROLOGY
Payer: COMMERCIAL

## 2017-07-21 DIAGNOSIS — N20.0: Primary | ICD-10-CM

## 2017-07-21 DIAGNOSIS — K57.30: ICD-10-CM

## 2017-07-21 DIAGNOSIS — K40.90: ICD-10-CM

## 2017-07-21 PROCEDURE — 74176 CT ABD & PELVIS W/O CONTRAST: CPT

## 2017-07-21 NOTE — DIAGNOSTIC IMAGING REPORT
PROCEDURE: CT abdomen and pelvis without contrast.



TECHNIQUE: Multiple contiguous axial images were obtained through

the abdomen and pelvis without the use of intravenous contrast. 



INDICATION: Gross hematuria.



FINDINGS: Renal outlines are smooth. Noncontrasted images show no

evidence of renal masses. There are renal calculi. There is

calcification in the mid calyx on the left measuring 5 mm. There

also is some subtle calcification of the lower pole renal

pyramid. No renal obstruction. The ureters appear normal. The

bladder is normal. No calculi in the bladder. No pelvic masses.

Prostate is not enlarged.



Lung bases are clear. Liver appears normal. Gallbladder and bile

ducts are normal. Pancreas and spleen are normal. The adrenal

glands are not enlarged. Stomach and small bowel are not

distended. Colon shows normal stool and gas pattern. Appendix is

visualized and normal. There is diverticulosis of the sigmoid

colon without evidence of diverticulitis. There is indirect

inguinal hernia on the left with small amount of abdominal fat.



IMPRESSION:

1. Nephrolithiasis of the left kidney without evidence of

obstruction. No masses are demonstrated on noncontrasted study.

2. Diverticulosis of the sigmoid colon without evidence of

diverticulitis.

3. Indirect small angle hernia on the left.



Dictated by: 



  Dictated on workstation # [id number]

## 2017-07-28 ENCOUNTER — HOSPITAL ENCOUNTER (OUTPATIENT)
Dept: HOSPITAL 75 - RAD | Age: 62
End: 2017-07-28
Attending: UROLOGY
Payer: COMMERCIAL

## 2017-07-28 DIAGNOSIS — N20.0: Primary | ICD-10-CM

## 2017-07-28 PROCEDURE — 74000: CPT

## 2017-07-28 NOTE — DIAGNOSTIC IMAGING REPORT
EXAM: Supine view of the abdomen.



INDICATION: Hematuria.



FINDINGS:  

There is a calcification projecting over the left flank measuring

7 mm, may relate to a kidney stone. No definite right renal stone

or other suspicious calcifications along the ureters or bladder.

Unremarkable bowel gas pattern is seen.



IMPRESSION:

Suggestion of a 7 mm left kidney stone.



Dictated by: 



  Dictated on workstation # [id number]

## 2017-10-13 ENCOUNTER — HOSPITAL ENCOUNTER (OUTPATIENT)
Dept: HOSPITAL 75 - REHAB | Age: 62
LOS: 39 days | Discharge: HOME | End: 2017-11-21
Attending: NURSE PRACTITIONER
Payer: COMMERCIAL

## 2017-10-13 DIAGNOSIS — M25.551: Primary | ICD-10-CM

## 2020-10-12 ENCOUNTER — HOSPITAL ENCOUNTER (OUTPATIENT)
Dept: HOSPITAL 75 - LABNPT | Age: 65
End: 2020-10-12
Attending: ORTHOPAEDIC SURGERY
Payer: COMMERCIAL

## 2020-10-12 DIAGNOSIS — Z01.812: Primary | ICD-10-CM

## 2020-10-12 DIAGNOSIS — Z20.828: ICD-10-CM

## 2020-10-12 PROCEDURE — 87635 SARS-COV-2 COVID-19 AMP PRB: CPT
